# Patient Record
Sex: FEMALE | Race: WHITE | NOT HISPANIC OR LATINO | Employment: OTHER | URBAN - METROPOLITAN AREA
[De-identification: names, ages, dates, MRNs, and addresses within clinical notes are randomized per-mention and may not be internally consistent; named-entity substitution may affect disease eponyms.]

---

## 2024-12-31 ENCOUNTER — HOSPITAL ENCOUNTER (OUTPATIENT)
Facility: HOSPITAL | Age: 89
Setting detail: OBSERVATION
Discharge: HOME | End: 2025-01-01
Attending: STUDENT IN AN ORGANIZED HEALTH CARE EDUCATION/TRAINING PROGRAM | Admitting: STUDENT IN AN ORGANIZED HEALTH CARE EDUCATION/TRAINING PROGRAM
Payer: MEDICARE

## 2024-12-31 ENCOUNTER — APPOINTMENT (OUTPATIENT)
Dept: CARDIOLOGY | Facility: HOSPITAL | Age: 89
End: 2024-12-31
Payer: MEDICARE

## 2024-12-31 ENCOUNTER — APPOINTMENT (OUTPATIENT)
Dept: RADIOLOGY | Facility: HOSPITAL | Age: 89
End: 2024-12-31
Payer: MEDICARE

## 2024-12-31 DIAGNOSIS — I21.4 NSTEMI (NON-ST ELEVATED MYOCARDIAL INFARCTION) (MULTI): Primary | ICD-10-CM

## 2024-12-31 PROBLEM — R07.89 OTHER CHEST PAIN: Status: ACTIVE | Noted: 2024-12-31

## 2024-12-31 PROBLEM — I48.0 PAROXYSMAL ATRIAL FIBRILLATION (MULTI): Status: ACTIVE | Noted: 2024-12-31

## 2024-12-31 LAB
ALBUMIN SERPL BCP-MCNC: 4.1 G/DL (ref 3.4–5)
ALP SERPL-CCNC: 69 U/L (ref 33–136)
ALT SERPL W P-5'-P-CCNC: 15 U/L (ref 7–45)
ANION GAP SERPL CALC-SCNC: 16 MMOL/L (ref 10–20)
APPEARANCE UR: CLEAR
AST SERPL W P-5'-P-CCNC: 27 U/L (ref 9–39)
BASOPHILS # BLD AUTO: 0.04 X10*3/UL (ref 0–0.1)
BASOPHILS NFR BLD AUTO: 0.1 %
BILIRUB SERPL-MCNC: 1 MG/DL (ref 0–1.2)
BILIRUB UR STRIP.AUTO-MCNC: NEGATIVE MG/DL
BUN SERPL-MCNC: 33 MG/DL (ref 6–23)
CALCIUM SERPL-MCNC: 9.4 MG/DL (ref 8.6–10.3)
CARDIAC TROPONIN I PNL SERPL HS: 21 NG/L (ref 0–13)
CARDIAC TROPONIN I PNL SERPL HS: 24 NG/L (ref 0–13)
CHLORIDE SERPL-SCNC: 102 MMOL/L (ref 98–107)
CO2 SERPL-SCNC: 22 MMOL/L (ref 21–32)
COLOR UR: YELLOW
CREAT SERPL-MCNC: 1.28 MG/DL (ref 0.5–1.05)
D DIMER PPP FEU-MCNC: 756 NG/ML FEU
EGFRCR SERPLBLD CKD-EPI 2021: 38 ML/MIN/1.73M*2
EOSINOPHIL # BLD AUTO: 0 X10*3/UL (ref 0–0.4)
EOSINOPHIL NFR BLD AUTO: 0 %
ERYTHROCYTE [DISTWIDTH] IN BLOOD BY AUTOMATED COUNT: 14.4 % (ref 11.5–14.5)
FLUAV RNA RESP QL NAA+PROBE: NOT DETECTED
FLUBV RNA RESP QL NAA+PROBE: NOT DETECTED
GLUCOSE SERPL-MCNC: 123 MG/DL (ref 74–99)
GLUCOSE UR STRIP.AUTO-MCNC: NORMAL MG/DL
HCT VFR BLD AUTO: 39.7 % (ref 36–46)
HGB BLD-MCNC: 12.8 G/DL (ref 12–16)
HOLD SPECIMEN: NORMAL
IMM GRANULOCYTES # BLD AUTO: 0.34 X10*3/UL (ref 0–0.5)
IMM GRANULOCYTES NFR BLD AUTO: 1.2 % (ref 0–0.9)
KETONES UR STRIP.AUTO-MCNC: NEGATIVE MG/DL
LEUKOCYTE ESTERASE UR QL STRIP.AUTO: ABNORMAL
LYMPHOCYTES # BLD AUTO: 1.74 X10*3/UL (ref 0.8–3)
LYMPHOCYTES NFR BLD AUTO: 6.2 %
MAGNESIUM SERPL-MCNC: 1.86 MG/DL (ref 1.6–2.4)
MCH RBC QN AUTO: 29.6 PG (ref 26–34)
MCHC RBC AUTO-ENTMCNC: 32.2 G/DL (ref 32–36)
MCV RBC AUTO: 92 FL (ref 80–100)
MONOCYTES # BLD AUTO: 2.01 X10*3/UL (ref 0.05–0.8)
MONOCYTES NFR BLD AUTO: 7.2 %
MUCOUS THREADS #/AREA URNS AUTO: NORMAL /LPF
NEUTROPHILS # BLD AUTO: 23.79 X10*3/UL (ref 1.6–5.5)
NEUTROPHILS NFR BLD AUTO: 85.3 %
NITRITE UR QL STRIP.AUTO: NEGATIVE
NRBC BLD-RTO: 0 /100 WBCS (ref 0–0)
PH UR STRIP.AUTO: 6 [PH]
PLATELET # BLD AUTO: 254 X10*3/UL (ref 150–450)
POTASSIUM SERPL-SCNC: 4.7 MMOL/L (ref 3.5–5.3)
PROT SERPL-MCNC: 6.7 G/DL (ref 6.4–8.2)
PROT UR STRIP.AUTO-MCNC: ABNORMAL MG/DL
RBC # BLD AUTO: 4.32 X10*6/UL (ref 4–5.2)
RBC # UR STRIP.AUTO: ABNORMAL /UL
RBC #/AREA URNS AUTO: NORMAL /HPF
SARS-COV-2 RNA RESP QL NAA+PROBE: NOT DETECTED
SODIUM SERPL-SCNC: 135 MMOL/L (ref 136–145)
SP GR UR STRIP.AUTO: 1.02
SQUAMOUS #/AREA URNS AUTO: NORMAL /HPF
UROBILINOGEN UR STRIP.AUTO-MCNC: NORMAL MG/DL
WBC # BLD AUTO: 27.9 X10*3/UL (ref 4.4–11.3)
WBC #/AREA URNS AUTO: NORMAL /HPF

## 2024-12-31 PROCEDURE — 71045 X-RAY EXAM CHEST 1 VIEW: CPT | Performed by: RADIOLOGY

## 2024-12-31 PROCEDURE — 84484 ASSAY OF TROPONIN QUANT: CPT

## 2024-12-31 PROCEDURE — 81001 URINALYSIS AUTO W/SCOPE: CPT

## 2024-12-31 PROCEDURE — 87636 SARSCOV2 & INF A&B AMP PRB: CPT

## 2024-12-31 PROCEDURE — 87086 URINE CULTURE/COLONY COUNT: CPT | Mod: STJLAB

## 2024-12-31 PROCEDURE — 71045 X-RAY EXAM CHEST 1 VIEW: CPT

## 2024-12-31 PROCEDURE — 36415 COLL VENOUS BLD VENIPUNCTURE: CPT | Performed by: STUDENT IN AN ORGANIZED HEALTH CARE EDUCATION/TRAINING PROGRAM

## 2024-12-31 PROCEDURE — 85025 COMPLETE CBC W/AUTO DIFF WBC: CPT

## 2024-12-31 PROCEDURE — 36415 COLL VENOUS BLD VENIPUNCTURE: CPT

## 2024-12-31 PROCEDURE — 80053 COMPREHEN METABOLIC PANEL: CPT

## 2024-12-31 PROCEDURE — 93005 ELECTROCARDIOGRAM TRACING: CPT

## 2024-12-31 PROCEDURE — G0378 HOSPITAL OBSERVATION PER HR: HCPCS

## 2024-12-31 PROCEDURE — 99285 EMERGENCY DEPT VISIT HI MDM: CPT | Performed by: STUDENT IN AN ORGANIZED HEALTH CARE EDUCATION/TRAINING PROGRAM

## 2024-12-31 PROCEDURE — 85379 FIBRIN DEGRADATION QUANT: CPT | Performed by: STUDENT IN AN ORGANIZED HEALTH CARE EDUCATION/TRAINING PROGRAM

## 2024-12-31 PROCEDURE — 2500000001 HC RX 250 WO HCPCS SELF ADMINISTERED DRUGS (ALT 637 FOR MEDICARE OP): Performed by: NURSE PRACTITIONER

## 2024-12-31 PROCEDURE — 83735 ASSAY OF MAGNESIUM: CPT

## 2024-12-31 RX ORDER — OXYCODONE AND ACETAMINOPHEN 5; 325 MG/1; MG/1
1.5 TABLET ORAL EVERY 8 HOURS PRN
Status: DISCONTINUED | OUTPATIENT
Start: 2024-12-31 | End: 2025-01-01 | Stop reason: HOSPADM

## 2024-12-31 RX ORDER — PANTOPRAZOLE SODIUM 40 MG/1
40 TABLET, DELAYED RELEASE ORAL
COMMUNITY

## 2024-12-31 RX ORDER — OXYCODONE AND ACETAMINOPHEN 7.5; 325 MG/1; MG/1
1 TABLET ORAL EVERY 8 HOURS PRN
COMMUNITY

## 2024-12-31 RX ORDER — DILTIAZEM HYDROCHLORIDE 180 MG/1
180 CAPSULE, COATED, EXTENDED RELEASE ORAL 2 TIMES DAILY
COMMUNITY

## 2024-12-31 RX ORDER — VALSARTAN 160 MG/1
80 TABLET ORAL DAILY
Status: DISCONTINUED | OUTPATIENT
Start: 2025-01-01 | End: 2025-01-01 | Stop reason: HOSPADM

## 2024-12-31 RX ORDER — PANTOPRAZOLE SODIUM 40 MG/1
40 TABLET, DELAYED RELEASE ORAL
Status: DISCONTINUED | OUTPATIENT
Start: 2025-01-01 | End: 2025-01-01 | Stop reason: HOSPADM

## 2024-12-31 RX ORDER — CANDESARTAN 8 MG/1
4 TABLET ORAL 2 TIMES DAILY
COMMUNITY

## 2024-12-31 RX ORDER — FUROSEMIDE 20 MG/1
20 TABLET ORAL DAILY
COMMUNITY

## 2024-12-31 RX ORDER — NAPROXEN SODIUM 220 MG/1
81 TABLET, FILM COATED ORAL DAILY
Status: DISCONTINUED | OUTPATIENT
Start: 2025-01-01 | End: 2025-01-01 | Stop reason: HOSPADM

## 2024-12-31 RX ORDER — DILTIAZEM HYDROCHLORIDE 180 MG/1
180 CAPSULE, COATED, EXTENDED RELEASE ORAL 2 TIMES DAILY
Status: DISCONTINUED | OUTPATIENT
Start: 2024-12-31 | End: 2025-01-01 | Stop reason: HOSPADM

## 2024-12-31 RX ORDER — ROSUVASTATIN CALCIUM 5 MG/1
5 TABLET, COATED ORAL DAILY
COMMUNITY

## 2024-12-31 RX ORDER — FUROSEMIDE 20 MG/1
20 TABLET ORAL DAILY
Status: DISCONTINUED | OUTPATIENT
Start: 2024-12-31 | End: 2025-01-01 | Stop reason: HOSPADM

## 2024-12-31 RX ORDER — ROSUVASTATIN CALCIUM 5 MG/1
5 TABLET, COATED ORAL DAILY
Status: DISCONTINUED | OUTPATIENT
Start: 2025-01-01 | End: 2025-01-01 | Stop reason: HOSPADM

## 2024-12-31 RX ORDER — GUAIFENESIN 100 MG/5ML
200 SOLUTION ORAL EVERY 4 HOURS PRN
Status: DISCONTINUED | OUTPATIENT
Start: 2024-12-31 | End: 2025-01-01 | Stop reason: HOSPADM

## 2024-12-31 RX ADMIN — FUROSEMIDE 20 MG: 20 TABLET ORAL at 17:06

## 2024-12-31 RX ADMIN — GUAIFENESIN 200 MG: 200 SOLUTION ORAL at 17:06

## 2024-12-31 RX ADMIN — DILTIAZEM HYDROCHLORIDE 180 MG: 180 CAPSULE, COATED, EXTENDED RELEASE ORAL at 20:48

## 2024-12-31 RX ADMIN — APIXABAN 2.5 MG: 5 TABLET, FILM COATED ORAL at 20:45

## 2024-12-31 SDOH — SOCIAL STABILITY: SOCIAL NETWORK
DO YOU BELONG TO ANY CLUBS OR ORGANIZATIONS SUCH AS CHURCH GROUPS, UNIONS, FRATERNAL OR ATHLETIC GROUPS, OR SCHOOL GROUPS?: NO

## 2024-12-31 SDOH — SOCIAL STABILITY: SOCIAL NETWORK: HOW OFTEN DO YOU GET TOGETHER WITH FRIENDS OR RELATIVES?: MORE THAN THREE TIMES A WEEK

## 2024-12-31 SDOH — SOCIAL STABILITY: SOCIAL INSECURITY: ABUSE: ADULT

## 2024-12-31 SDOH — HEALTH STABILITY: MENTAL HEALTH
DO YOU FEEL STRESS - TENSE, RESTLESS, NERVOUS, OR ANXIOUS, OR UNABLE TO SLEEP AT NIGHT BECAUSE YOUR MIND IS TROUBLED ALL THE TIME - THESE DAYS?: NOT AT ALL

## 2024-12-31 SDOH — SOCIAL STABILITY: SOCIAL INSECURITY
WITHIN THE LAST YEAR, HAVE YOU BEEN RAPED OR FORCED TO HAVE ANY KIND OF SEXUAL ACTIVITY BY YOUR PARTNER OR EX-PARTNER?: NO

## 2024-12-31 SDOH — SOCIAL STABILITY: SOCIAL INSECURITY: WITHIN THE LAST YEAR, HAVE YOU BEEN AFRAID OF YOUR PARTNER OR EX-PARTNER?: NO

## 2024-12-31 SDOH — ECONOMIC STABILITY: INCOME INSECURITY: IN THE PAST 12 MONTHS HAS THE ELECTRIC, GAS, OIL, OR WATER COMPANY THREATENED TO SHUT OFF SERVICES IN YOUR HOME?: NO

## 2024-12-31 SDOH — SOCIAL STABILITY: SOCIAL NETWORK: HOW OFTEN DO YOU ATTEND CHURCH OR RELIGIOUS SERVICES?: NEVER

## 2024-12-31 SDOH — SOCIAL STABILITY: SOCIAL INSECURITY: DOES ANYONE TRY TO KEEP YOU FROM HAVING/CONTACTING OTHER FRIENDS OR DOING THINGS OUTSIDE YOUR HOME?: NO

## 2024-12-31 SDOH — SOCIAL STABILITY: SOCIAL INSECURITY: DO YOU FEEL ANYONE HAS EXPLOITED OR TAKEN ADVANTAGE OF YOU FINANCIALLY OR OF YOUR PERSONAL PROPERTY?: NO

## 2024-12-31 SDOH — SOCIAL STABILITY: SOCIAL NETWORK
IN A TYPICAL WEEK, HOW MANY TIMES DO YOU TALK ON THE PHONE WITH FAMILY, FRIENDS, OR NEIGHBORS?: MORE THAN THREE TIMES A WEEK

## 2024-12-31 SDOH — ECONOMIC STABILITY: HOUSING INSECURITY: AT ANY TIME IN THE PAST 12 MONTHS, WERE YOU HOMELESS OR LIVING IN A SHELTER (INCLUDING NOW)?: NO

## 2024-12-31 SDOH — SOCIAL STABILITY: SOCIAL INSECURITY: HAS ANYONE EVER THREATENED TO HURT YOUR FAMILY OR YOUR PETS?: NO

## 2024-12-31 SDOH — HEALTH STABILITY: PHYSICAL HEALTH
HOW OFTEN DO YOU NEED TO HAVE SOMEONE HELP YOU WHEN YOU READ INSTRUCTIONS, PAMPHLETS, OR OTHER WRITTEN MATERIAL FROM YOUR DOCTOR OR PHARMACY?: NEVER

## 2024-12-31 SDOH — SOCIAL STABILITY: SOCIAL INSECURITY
WITHIN THE LAST YEAR, HAVE YOU BEEN KICKED, HIT, SLAPPED, OR OTHERWISE PHYSICALLY HURT BY YOUR PARTNER OR EX-PARTNER?: NO

## 2024-12-31 SDOH — ECONOMIC STABILITY: FOOD INSECURITY: WITHIN THE PAST 12 MONTHS, THE FOOD YOU BOUGHT JUST DIDN'T LAST AND YOU DIDN'T HAVE MONEY TO GET MORE.: NEVER TRUE

## 2024-12-31 SDOH — SOCIAL STABILITY: SOCIAL INSECURITY: WITHIN THE LAST YEAR, HAVE YOU BEEN HUMILIATED OR EMOTIONALLY ABUSED IN OTHER WAYS BY YOUR PARTNER OR EX-PARTNER?: NO

## 2024-12-31 SDOH — ECONOMIC STABILITY: FOOD INSECURITY: WITHIN THE PAST 12 MONTHS, YOU WORRIED THAT YOUR FOOD WOULD RUN OUT BEFORE YOU GOT THE MONEY TO BUY MORE.: NEVER TRUE

## 2024-12-31 SDOH — SOCIAL STABILITY: SOCIAL INSECURITY: HAVE YOU HAD THOUGHTS OF HARMING ANYONE ELSE?: NO

## 2024-12-31 SDOH — SOCIAL STABILITY: SOCIAL INSECURITY: WERE YOU ABLE TO COMPLETE ALL THE BEHAVIORAL HEALTH SCREENINGS?: YES

## 2024-12-31 SDOH — HEALTH STABILITY: PHYSICAL HEALTH: ON AVERAGE, HOW MANY DAYS PER WEEK DO YOU ENGAGE IN MODERATE TO STRENUOUS EXERCISE (LIKE A BRISK WALK)?: 3 DAYS

## 2024-12-31 SDOH — SOCIAL STABILITY: SOCIAL INSECURITY: DO YOU FEEL UNSAFE GOING BACK TO THE PLACE WHERE YOU ARE LIVING?: NO

## 2024-12-31 SDOH — SOCIAL STABILITY: SOCIAL INSECURITY: ARE YOU OR HAVE YOU BEEN THREATENED OR ABUSED PHYSICALLY, EMOTIONALLY, OR SEXUALLY BY ANYONE?: NO

## 2024-12-31 SDOH — HEALTH STABILITY: PHYSICAL HEALTH: ON AVERAGE, HOW MANY MINUTES DO YOU ENGAGE IN EXERCISE AT THIS LEVEL?: 30 MIN

## 2024-12-31 SDOH — SOCIAL STABILITY: SOCIAL NETWORK: HOW OFTEN DO YOU ATTEND MEETINGS OF THE CLUBS OR ORGANIZATIONS YOU BELONG TO?: NEVER

## 2024-12-31 SDOH — SOCIAL STABILITY: SOCIAL INSECURITY: HAVE YOU HAD ANY THOUGHTS OF HARMING ANYONE ELSE?: NO

## 2024-12-31 SDOH — SOCIAL STABILITY: SOCIAL INSECURITY: ARE THERE ANY APPARENT SIGNS OF INJURIES/BEHAVIORS THAT COULD BE RELATED TO ABUSE/NEGLECT?: NO

## 2024-12-31 ASSESSMENT — ACTIVITIES OF DAILY LIVING (ADL)
HEARING - RIGHT EAR: DIFFICULTY WITH NOISE
ADEQUATE_TO_COMPLETE_ADL: YES
FEEDING YOURSELF: INDEPENDENT
LACK_OF_TRANSPORTATION: NO
HEARING - LEFT EAR: DIFFICULTY WITH NOISE
GROOMING: INDEPENDENT
DRESSING YOURSELF: INDEPENDENT
JUDGMENT_ADEQUATE_SAFELY_COMPLETE_DAILY_ACTIVITIES: YES
WALKS IN HOME: INDEPENDENT
TOILETING: INDEPENDENT
PATIENT'S MEMORY ADEQUATE TO SAFELY COMPLETE DAILY ACTIVITIES?: YES
BATHING: INDEPENDENT

## 2024-12-31 ASSESSMENT — LIFESTYLE VARIABLES
HOW OFTEN DO YOU HAVE 6 OR MORE DRINKS ON ONE OCCASION: NEVER
TOTAL SCORE: 0
AUDIT-C TOTAL SCORE: 2
EVER FELT BAD OR GUILTY ABOUT YOUR DRINKING: NO
HAVE PEOPLE ANNOYED YOU BY CRITICIZING YOUR DRINKING: NO
AUDIT-C TOTAL SCORE: 2
SKIP TO QUESTIONS 9-10: 1
HAVE YOU EVER FELT YOU SHOULD CUT DOWN ON YOUR DRINKING: NO
SUBSTANCE_ABUSE_PAST_12_MONTHS: NO
HOW OFTEN DO YOU HAVE A DRINK CONTAINING ALCOHOL: 2-4 TIMES A MONTH
EVER HAD A DRINK FIRST THING IN THE MORNING TO STEADY YOUR NERVES TO GET RID OF A HANGOVER: NO
PRESCIPTION_ABUSE_PAST_12_MONTHS: NO
HOW MANY STANDARD DRINKS CONTAINING ALCOHOL DO YOU HAVE ON A TYPICAL DAY: 1 OR 2

## 2024-12-31 ASSESSMENT — COGNITIVE AND FUNCTIONAL STATUS - GENERAL
DAILY ACTIVITIY SCORE: 24
CLIMB 3 TO 5 STEPS WITH RAILING: A LITTLE
PATIENT BASELINE BEDBOUND: NO
MOBILITY SCORE: 23

## 2024-12-31 ASSESSMENT — COLUMBIA-SUICIDE SEVERITY RATING SCALE - C-SSRS
6. HAVE YOU EVER DONE ANYTHING, STARTED TO DO ANYTHING, OR PREPARED TO DO ANYTHING TO END YOUR LIFE?: NO
6. HAVE YOU EVER DONE ANYTHING, STARTED TO DO ANYTHING, OR PREPARED TO DO ANYTHING TO END YOUR LIFE?: NO
1. IN THE PAST MONTH, HAVE YOU WISHED YOU WERE DEAD OR WISHED YOU COULD GO TO SLEEP AND NOT WAKE UP?: NO
2. HAVE YOU ACTUALLY HAD ANY THOUGHTS OF KILLING YOURSELF?: NO
2. HAVE YOU ACTUALLY HAD ANY THOUGHTS OF KILLING YOURSELF?: NO
1. IN THE PAST MONTH, HAVE YOU WISHED YOU WERE DEAD OR WISHED YOU COULD GO TO SLEEP AND NOT WAKE UP?: NO

## 2024-12-31 ASSESSMENT — HEART SCORE
HISTORY: SLIGHTLY SUSPICIOUS
AGE: 65+
TROPONIN: 1-3 TIMES NORMAL LIMIT
RISK FACTORS: >2 RISK FACTORS OR HX OF ATHEROSCLEROTIC DISEASE
ECG: NORMAL
HEART SCORE: 5

## 2024-12-31 ASSESSMENT — PATIENT HEALTH QUESTIONNAIRE - PHQ9
SUM OF ALL RESPONSES TO PHQ9 QUESTIONS 1 & 2: 0
1. LITTLE INTEREST OR PLEASURE IN DOING THINGS: NOT AT ALL
2. FEELING DOWN, DEPRESSED OR HOPELESS: NOT AT ALL

## 2024-12-31 ASSESSMENT — ENCOUNTER SYMPTOMS
COUGH: 1
NEUROLOGICAL NEGATIVE: 1
FEVER: 0
ACTIVITY CHANGE: 0
GASTROINTESTINAL NEGATIVE: 1
SHORTNESS OF BREATH: 1
CHILLS: 0
MUSCULOSKELETAL NEGATIVE: 1
PSYCHIATRIC NEGATIVE: 1

## 2024-12-31 ASSESSMENT — PAIN SCALES - GENERAL
PAINLEVEL_OUTOF10: 0 - NO PAIN
PAINLEVEL_OUTOF10: 0 - NO PAIN

## 2024-12-31 ASSESSMENT — PAIN - FUNCTIONAL ASSESSMENT
PAIN_FUNCTIONAL_ASSESSMENT: 0-10
PAIN_FUNCTIONAL_ASSESSMENT: 0-10

## 2024-12-31 NOTE — H&P
"History Of Present Illness  Lu Luz is a 89 y.o. female  with history including carotid artery stenosis, hyperlipidemia, hypertension, sick sinus syndrome, A-fib on Eliquis presenting to the emergency department for chest pain that occurred yesterday.  Patient states she was sitting down when she experienced upper diffuse chest discomfort that was non radiating, subsided without intervention. Patient states she went to bed and woke up and has not experienced any symptoms since that time. Daughter at bedside indicates that her mother did not tell her until today, but indicated \"thought I was having a heart attack\" so she brought her to the ED. She states that her mother was at the daughter's 's  on Friday, she was around a lot of people who have indicated they were sick. Patient also reports a wet cough since the weekend. Denies any leg edema, is on Lasix at home, denies any orthopnea. She denies any diaphoresis. She does endorse shortness of breath and nausea with the chest pain episode yesterday which has also resolved.  Has not had any chest pain today, does states she has mild lightheadedness.  Currently denies any chest pain, shortness of breath, abdominal pain, nausea or vomiting, constipation diarrhea, dysuria. Daughter indicates EMS indicated that mom was in atrial fibrillation and she does not remember that being a diagnosis for her. Patient to be admitted for acs rule out.      Past Medical History  CKD (chronic kidney disease) 2015   Controlled type 2 diabetes mellitus without complication, without long-term current use of insulin (HCC) 2016   DDD (degenerative disc disease), cervical 2017   Depression   Exudative age-related macular degeneration, left eye, with active choroidal neovascularization (HCC)   Generalized osteoarthritis   GERD (gastroesophageal reflux disease)   HTN (hypertension)   Hyperlipidemia   Occlusion and stenosis of carotid artery without mention " of cerebral infarction   Paroxysmal atrial fibrillation (HCC)   Retinal edema   Spinal stenosis, lumbar region, without neurogenic claudication 2010     Surgical History  AVASTIN (BEVACIZUMAB) 1.25MG INTRAVITREAL INJECTION OS (LEFT EYE) Left 2022   LAST   CATARACT EXTRACTION HX Right 10/13/2021   CHOLECYSTECTOMY 's   COLON SURGERY HX 2012   twisted colon   COLONOSCOPY    Dr. Mondragon   EGD 2014   EXC/DSTRJ LINGUAL TONSIL ANY METHOD SPX Child   EYLEA (AFLIBERCEPT) 2MG INTRAVITREAL INJECTION OS (LEFT EYE) Left 10/04/2022   last   INJ WO/CATH ANES/STER LS 2010   BLOCK STEROID EPIDURAL LUMBAR WITH C-ARM performed by CATHERINE ALEXANDER at  [facility]   REMOVE CATARACT, INSERT LENS,EX Left 20 + years ago   SHX VASCULAR SURGERY 2015   right CEA   VAGINAL HYSTERECTOMY UTERUS 250 GM/< late 1970's   One ovary remains   VITRECTOMY MECHANICAL PARS PLANA Left 2016   PPV and exchange of posterior chamber lens for anterior chamber lens.      Social History  Smoking status: Former   Packs/day: 2.00   Years: 35.00   Additional pack years: 0.00   Total pack years: 70.00   Types: Cigarettes   Quit date: 6/3/1979   Years since quittin.9   Smokeless tobacco: Never   Vaping Use   Vaping Use: Never used   Substance Use Topics   Alcohol use: Yes   Alcohol/week: 2.0 standard drinks of alcohol   Types: 2 Glasses of Wine (5oz) per week   Comment: occassional 1-2 drinks per week   Drug use: No     Family History  Hypertension Mother      Alzheimer's Disease Mother   DVT Mother   Macular Degen Mother   Heart Father      Breast Cancer Sister      Colon Cancer No Family History      Allergies  Patient has no known allergies.    Review of Systems   Constitutional:  Negative for activity change, chills and fever.   HENT: Negative.     Respiratory:  Positive for cough and shortness of breath.    Cardiovascular:  Positive for chest pain.   Gastrointestinal: Negative.    Genitourinary:  "Negative.    Musculoskeletal: Negative.    Skin: Negative.    Neurological: Negative.    Psychiatric/Behavioral: Negative.        ROS: 12 systems reviewed and negative except per HPI above     Physical Exam by System:     Constitutional: Well developed, awake/alert/oriented x3, no distress, alert and cooperative   ENMT: mucous membranes moist   Head/Neck: Neck supple   Respiratory/Thorax: Patent airways, CTAB, normal breath sounds with good chest expansion, thorax symmetric   Cardiovascular: IRR, no murmurs, 2+ equal pulses of the extremities, normal S 1and S 2   Gastrointestinal: Nondistended, soft, non-tender, no rebound tenderness or guarding, no masses palpable, no organomegaly, +BS, no bruits   Musculoskeletal: ROM intact, no joint swelling, normal strength   Extremities: normal extremities, no cyanosis edema, contusions or wounds, no clubbing   Neurological: alert and oriented x3, intact senses, motor, response and reflexes, normal strength       Last Recorded Vitals  Blood pressure 156/69, pulse 74, temperature 37 °C (98.6 °F), temperature source Tympanic, resp. rate 16, height 1.626 m (5' 4\"), weight 52 kg (114 lb 10.2 oz), SpO2 98%.    Relevant Results  Results for orders placed or performed during the hospital encounter of 12/31/24 (from the past 24 hours)   CBC and Auto Differential   Result Value Ref Range    WBC 27.9 (H) 4.4 - 11.3 x10*3/uL    nRBC 0.0 0.0 - 0.0 /100 WBCs    RBC 4.32 4.00 - 5.20 x10*6/uL    Hemoglobin 12.8 12.0 - 16.0 g/dL    Hematocrit 39.7 36.0 - 46.0 %    MCV 92 80 - 100 fL    MCH 29.6 26.0 - 34.0 pg    MCHC 32.2 32.0 - 36.0 g/dL    RDW 14.4 11.5 - 14.5 %    Platelets 254 150 - 450 x10*3/uL    Neutrophils % 85.3 40.0 - 80.0 %    Immature Granulocytes %, Automated 1.2 (H) 0.0 - 0.9 %    Lymphocytes % 6.2 13.0 - 44.0 %    Monocytes % 7.2 2.0 - 10.0 %    Eosinophils % 0.0 0.0 - 6.0 %    Basophils % 0.1 0.0 - 2.0 %    Neutrophils Absolute 23.79 (H) 1.60 - 5.50 x10*3/uL    Immature " Granulocytes Absolute, Automated 0.34 0.00 - 0.50 x10*3/uL    Lymphocytes Absolute 1.74 0.80 - 3.00 x10*3/uL    Monocytes Absolute 2.01 (H) 0.05 - 0.80 x10*3/uL    Eosinophils Absolute 0.00 0.00 - 0.40 x10*3/uL    Basophils Absolute 0.04 0.00 - 0.10 x10*3/uL   Comprehensive Metabolic Panel   Result Value Ref Range    Glucose 123 (H) 74 - 99 mg/dL    Sodium 135 (L) 136 - 145 mmol/L    Potassium 4.7 3.5 - 5.3 mmol/L    Chloride 102 98 - 107 mmol/L    Bicarbonate 22 21 - 32 mmol/L    Anion Gap 16 10 - 20 mmol/L    Urea Nitrogen 33 (H) 6 - 23 mg/dL    Creatinine 1.28 (H) 0.50 - 1.05 mg/dL    eGFR 38 (L) >60 mL/min/1.73m*2    Calcium 9.4 8.6 - 10.3 mg/dL    Albumin 4.1 3.4 - 5.0 g/dL    Alkaline Phosphatase 69 33 - 136 U/L    Total Protein 6.7 6.4 - 8.2 g/dL    AST 27 9 - 39 U/L    Bilirubin, Total 1.0 0.0 - 1.2 mg/dL    ALT 15 7 - 45 U/L   Magnesium   Result Value Ref Range    Magnesium 1.86 1.60 - 2.40 mg/dL   Troponin I, High Sensitivity, Initial   Result Value Ref Range    Troponin I, High Sensitivity 24 (H) 0 - 13 ng/L   Sars-CoV-2 and Influenza A/B PCR   Result Value Ref Range    Flu A Result Not Detected Not Detected    Flu B Result Not Detected Not Detected    Coronavirus 2019, PCR Not Detected Not Detected   Light Blue Top   Result Value Ref Range    Extra Tube Hold for add-ons.    D-dimer, VTE Exclusion   Result Value Ref Range    D-Dimer, Quantitative VTE Exclusion 756 (H) <=500 ng/mL FEU   Troponin, High Sensitivity, 1 Hour   Result Value Ref Range    Troponin I, High Sensitivity 21 (H) 0 - 13 ng/L      Scheduled medications    Continuous medications    PRN medications       XR chest 1 view    Result Date: 12/31/2024  Interpreted By:  Carin Thayer, STUDY: Chest, single AP view.   INDICATION: Signs/Symptoms:Chest Pain, palpitations.   COMPARISON: None   ACCESSION NUMBER(S): [id number]   ORDERING CLINICIAN: CHRIS CLARK   FINDINGS: The cardiac silhouette size is within normal limits. There is no focal  consolidation, edema or pneumothorax. No sizeable pleural effusion. No acute osseous abnormality.       1. No acute cardiopulmonary process.   MACRO: None.   Signed by: Carin Thayer 12/31/2024 2:10 PM Dictation workstation:   [id number]       Assessment/Plan     Chest pain r/o acs  Upper respiratory symptoms including cough, likely viral   History of CKD  History of T2DM  History hypertension  History of hyperlipidemia  History of GERD  History of paroxysmal atrial fibrillation    Plan:    -Admit to observation with tele  -Current vitals jeanette stable and patient is 98% on room air  -Cardiology placed on consult Dr. Coello, defer echo to cardiology  -Troponin 24, second troponin 21  -Added Aspirin 81 mg daily  -Resume patient medications including Eliquis, Cardizem CD, Candesartan, 5 mg Crestor  -Am labs including cbc, bmp, mag, lipid  -Hemoglobin A1C in 10/24 was 6.5  -D dimer 756 age adjusted d dimer 970ug/L VTE unlikely   -Influenza A B and Covid not detected  -Add robitussin q 4 hours PRN for wet cough  -POC discussed with patient and attending  -Dvtp: resume patient Eliquis  -PT OT  -Dispo: likely require less than 2 midnight stays to adequately treat and safe dc plan    Code status discussed with patient and patient's daughter Izabella who is POA, patient is DNR DNI    I spent >50 minutes in the professional and overall care of this patient.    SIGNATURE: NAE Junior-CNP PATIENT NAME: Lu Luz   DATE: December 31, 2024 MRN: [id number]   TIME: 3:19 PM

## 2024-12-31 NOTE — ED PROVIDER NOTES
EMERGENCY DEPARTMENT ENCOUNTER      Pt Name: Lu Luz  MRN: [id number]  Birthdate 3/30/1927  Date of evaluation: 12/31/2024    HISTORY OF PRESENT ILLNESS    Lu Luz is an 89 y.o. female with history including carotid artery stenosis, hyperlipidemia, hypertension, sick sinus syndrome, A-fib on Eliquis presenting to the emergency department for chest pain that occurred yesterday.  She is not having any pain at this moment.  She has also had a cough for a few days.  Has not had any chest pain today, does states she has mild lightheadedness.  Currently denies any chest pain, shortness of breath, abdominal pain, nausea or vomiting, constipation diarrhea, dysuria.      PAST MEDICAL HISTORY   No past medical history on file.    SURGICAL HISTORY     No past surgical history on file.    CURRENT MEDICATIONS       Previous Medications    APIXABAN (ELIQUIS) 2.5 MG TABLET    Take 1 tablet (2.5 mg) by mouth 2 times a day.    CANDESARTAN (ATACAND) 8 MG TABLET    Take 0.5 tablets (4 mg) by mouth 2 times a day.    DILTIAZEM CD (CARDIZEM CD) 180 MG 24 HR CAPSULE    Take 1 capsule (180 mg) by mouth 2 times a day.    FUROSEMIDE (LASIX) 20 MG TABLET    Take 1 tablet (20 mg) by mouth once daily.    OXYCODONE-ACETAMINOPHEN (PERCOCET) 7.5-325 MG TABLET    Take 1 tablet by mouth every 8 hours if needed for severe pain (7 - 10).    PANTOPRAZOLE (PROTONIX) 40 MG EC TABLET    Take 1 tablet (40 mg) by mouth once daily in the morning. Take before meals. Do not crush, chew, or split.    ROSUVASTATIN (CRESTOR) 5 MG TABLET    Take 1 tablet (5 mg) by mouth once daily.       ALLERGIES     Patient has no known allergies.    FAMILY HISTORY     No family history on file.     SOCIAL HISTORY       Social History     Socioeconomic History    Marital status:    Tobacco Use    Smoking status: Never     Social Drivers of Health     Financial Resource Strain: Patient Declined (11/13/2024)    Received from [facility]    Overall Financial  Resource Strain (CARDIA)     Difficulty of Paying Living Expenses: Patient declined   Food Insecurity: Patient Declined (11/13/2024)    Received from [facility]    Hunger Vital Sign     Worried About Running Out of Food in the Last Year: Patient declined     Ran Out of Food in the Last Year: Patient declined   Transportation Needs: Patient Declined (11/13/2024)    Received from [facility]    PRAPARE - Transportation     Lack of Transportation (Medical): Patient declined     Lack of Transportation (Non-Medical): Patient declined   Physical Activity: Patient Declined (11/13/2024)    Received from [facility]    Exercise Vital Sign     Days of Exercise per Week: Patient declined     Minutes of Exercise per Session: Patient declined   Stress: Patient Declined (11/13/2024)    Received from [facility]    [place] [employer] - Occupational Stress Questionnaire     Feeling of Stress : Patient declined   Social Connections: Unknown (11/13/2024)    Received from [facility]    Social Connection and Isolation Panel [NHANES]     Frequency of Communication with Friends and Family: More than three times a week     Frequency of Social Gatherings with Friends and Family: Once a week     Attends Tenriism Services: Patient declined     Active Member of Clubs or Organizations: Patient declined     Attends Club or Organization Meetings: Patient declined     Marital Status: Patient declined   Housing Stability: Low Risk  (10/16/2023)    Received from [facility]    Housing Stability Vital Sign     Unable to Pay for Housing in the Last Year: No     Number of Places Lived in the Last Year: 1     Unstable Housing in the Last Year: No       PHYSICAL EXAM       ED Triage Vitals [12/31/24 1327]   Temperature Heart Rate Respirations BP   37 °C (98.6 °F) 74 16 156/69      Pulse Ox Temp Source Heart Rate Source Patient Position   98 % Tympanic Monitor Lying      BP Location FiO2 (%)      Right arm --       Physical Exam  Constitutional:       Appearance: She is well-developed.   HENT:      Head: Normocephalic and atraumatic.   Eyes:      Pupils: Pupils are equal, round, and reactive to light.   Cardiovascular:      Rate and Rhythm: Normal rate. Rhythm irregular.      Pulses:           Radial pulses are 2+ on the right side and 2+ on the left side.        Dorsalis pedis pulses are 2+ on the right side and 2+ on the left side.      Heart sounds: Normal heart sounds.   Pulmonary:      Effort: Pulmonary effort is normal. No tachypnea.      Breath sounds: Examination of the left-lower field reveals rhonchi and rales. Rhonchi and rales present.   Abdominal:      General: Bowel sounds are normal.      Palpations: Abdomen is soft.   Musculoskeletal:         General: Normal range of motion.      Cervical back: Normal range of motion.      Right lower leg: No edema.      Left lower leg: No edema.   Skin:     General: Skin is warm.      Capillary Refill: Capillary refill takes less than 2 seconds.   Neurological:      General: No focal deficit present.      Mental Status: She is alert.          DIAGNOSTIC RESULTS     LABS:  Labs Reviewed   CBC WITH AUTO DIFFERENTIAL - Abnormal       Result Value    WBC 27.9 (*)     nRBC 0.0      RBC 4.32      Hemoglobin 12.8      Hematocrit 39.7      MCV 92      MCH 29.6      MCHC 32.2      RDW 14.4      Platelets 254      Neutrophils % 85.3      Immature Granulocytes %, Automated 1.2 (*)     Lymphocytes % 6.2      Monocytes % 7.2      Eosinophils % 0.0      Basophils % 0.1      Neutrophils Absolute 23.79 (*)     Immature Granulocytes Absolute, Automated 0.34      Lymphocytes Absolute 1.74      Monocytes Absolute 2.01 (*)     Eosinophils Absolute 0.00      Basophils Absolute 0.04     COMPREHENSIVE METABOLIC PANEL - Abnormal    Glucose 123 (*)     Sodium 135 (*)     Potassium 4.7      Chloride 102      Bicarbonate 22      Anion Gap 16      Urea Nitrogen 33 (*)     Creatinine  1.28 (*)     eGFR 38 (*)     Calcium 9.4      Albumin 4.1      Alkaline Phosphatase 69      Total Protein 6.7      AST 27      Bilirubin, Total 1.0      ALT 15     SERIAL TROPONIN-INITIAL - Abnormal    Troponin I, High Sensitivity 24 (*)     Narrative:     Less than 99th percentile of normal range cutoff-  Female and children under 18 years old <14 ng/L; Male <21 ng/L: Negative  Repeat testing should be performed if clinically indicated.     Female and children under 18 years old 14-50 ng/L; Male 21-50 ng/L:  Consistent with possible cardiac damage and possible increased clinical   risk. Serial measurements may help to assess extent of myocardial damage.     >50 ng/L: Consistent with cardiac damage, increased clinical risk and  myocardial infarction. Serial measurements may help assess extent of   myocardial damage.      NOTE: Children less than 1 year old may have higher baseline troponin   levels and results should be interpreted in conjunction with the overall   clinical context.     NOTE: Troponin I testing is performed using a different   testing methodology at [facility] than at other   [facility]. Direct result comparisons should only   be made within the same method.   SERIAL TROPONIN, 1 HOUR - Abnormal    Troponin I, High Sensitivity 21 (*)     Narrative:     Less than 99th percentile of normal range cutoff-  Female and children under 18 years old <14 ng/L; Male <21 ng/L: Negative  Repeat testing should be performed if clinically indicated.     Female and children under 18 years old 14-50 ng/L; Male 21-50 ng/L:  Consistent with possible cardiac damage and possible increased clinical   risk. Serial measurements may help to assess extent of myocardial damage.     >50 ng/L: Consistent with cardiac damage, increased clinical risk and  myocardial infarction. Serial measurements may help assess extent of   myocardial damage.      NOTE: Children less than 1 year old may have higher baseline  troponin   levels and results should be interpreted in conjunction with the overall   clinical context.     NOTE: Troponin I testing is performed using a different   testing methodology at [facility] than at other   [facility] hospitals. Direct result comparisons should only   be made within the same method.   D-DIMER, VTE EXCLUSION - Abnormal    D-Dimer, Quantitative VTE Exclusion 756 (*)     Narrative:     The VTE Exclusion D-Dimer assay is reported in ng/mL Fibrinogen Equivalent Units (FEU).    Per 's instructions for use, a value of less than 500 ng/mL (FEU) may help to exclude DVT or PE in outpatients when the assay is used with a clinical pretest probability assessment.(AEMR must utilize and document eCalc 'Wells Score Deep Vein Thrombosis Risk' for DVT exclusion only. Emergency Department should utilize  Guidelines for Emergency Department Use of the VTE Exclusion D-Dimer and Clinical Pretest probability assessment model for DVT or PE exclusion.)   URINALYSIS WITH REFLEX CULTURE AND MICROSCOPIC - Abnormal    Color, Urine Yellow      Appearance, Urine Clear      Specific Gravity, Urine 1.018      pH, Urine 6.0      Protein, Urine 200 (2+) (*)     Glucose, Urine Normal      Blood, Urine 0.03 (TRACE) (*)     Ketones, Urine NEGATIVE      Bilirubin, Urine NEGATIVE      Urobilinogen, Urine Normal      Nitrite, Urine NEGATIVE      Leukocyte Esterase, Urine 25 Madi/µL (*)    MAGNESIUM - Normal    Magnesium 1.86     SARS-COV-2 AND INFLUENZA A/B PCR - Normal    Flu A Result Not Detected      Flu B Result Not Detected      Coronavirus 2019, PCR Not Detected      Narrative:     This assay has received FDA Emergency Use Authorization (EUA) and  is only authorized for the duration of time that circumstances exist to justify the authorization of the emergency use of in vitro diagnostic tests for the detection of SARS-CoV-2 virus and/or diagnosis of COVID-19 infection under section 564(b)(1) of the  Act, 21 U.S.C. 360bbb-3(b)(1). Testing for SARS-CoV-2 is only recommended for patients who meet current clinical and/or epidemiological criteria as defined by federal, state, or local public health directives. This assay is an in vitro diagnostic nucleic acid amplification test for the qualitative detection of SARS-CoV-2, Influenza A, and Influenza B from nasopharyngeal specimens and has been validated for use at [facility]. Negative results do not preclude COVID-19 infections or Influenza A/B infections, and should not be used as the sole basis for diagnosis, treatment, or other management decisions. If Influenza A/B and RSV PCR results are negative, testing for Parainfluenza virus, Adenovirus and Metapneumovirus is routinely performed for [facility] pediatric oncology and intensive care inpatients, and is available on other patients by placing an add-on request.    URINE CULTURE   TROPONIN SERIES- (INITIAL, 1 HR)    Narrative:     The following orders were created for panel order Troponin I Series, High Sensitivity (0, 1 HR).  Procedure                               Abnormality         Status                     ---------                               -----------         ------                     Troponin I, High Sensiti...[[id number]]  Abnormal            Final result               Troponin, High Sensitivi...[[id number]]  Abnormal            Final result                 Please view results for these tests on the individual orders.   MICROSCOPIC ONLY, URINE    WBC, Urine 1-5      RBC, Urine 1-2      Squamous Epithelial Cells, Urine 1-9 (SPARSE)      Mucus, Urine FEW     URINALYSIS WITH REFLEX CULTURE AND MICROSCOPIC    Narrative:     The following orders were created for panel order Urinalysis with Reflex Culture and Microscopic.  Procedure                               Abnormality         Status                     ---------                               -----------         ------                      Urinalysis with Reflex C...[[id number]]  Abnormal            Final result               Extra Urine Gray Tube[[id number]]                                                         Please view results for these tests on the individual orders.   EXTRA URINE GRAY TUBE       All other labs were within normal range or not returned as of this dictation.    Imaging  XR chest 1 view   Final Result   1. No acute cardiopulmonary process.        MACRO:   None.        Signed by: Carin Thayer 12/31/2024 2:10 PM   Dictation workstation:   [id number]           Procedures  Procedures     EMERGENCY DEPARTMENT COURSE/MDM:   Medical Decision Making  Patient is a 89-year-old female presenting to the emergency department due to episode of chest pain/palpitations last night.  She is currently only complaining of some lightheadedness.  She also has had a cough as well since yesterday..  CBC, CMP, mag, COVID and flu, troponin 0/1-hour as well as a D-dimer.  D-dimer is below age-adjusted suggested cutoff of 970.  Will hold off on CT PE.  Chest x-ray showing no obvious pneumonia, despite some rales noted on physical exam.  Patient does have a significant leukocytosis to 27.9 however no other SIRS criteria.  Troponin elevated 24 on first cycle and pending.  Patient appears to have CKD versus REAL with creatinine of 1.28.  No recent to compare to.  COVID and flu negative.  UA pending.  Given heart score of 5 due to age and risk factors and troponin will admit for further cardiac workup with concern for NSTEMI.  Patient admitted to medicine.  Will follow-up on troponin if we need to reach out to cardiology.  Troponin downtrending.    ED Course as of 12/31/24 1853   Tue Dec 31, 2024   1408 WBC(!): 27.9 [IS]   1408 Creatinine(!): 1.28 [IS]   1409 D-dimer, VTE Exclusion(!)  Age-adjusted D-dimer less than suggested cutoff of 970.  No indication for CT PE at this time [IS]   1414 XR chest 1 view  IMPRESSION:  1. No acute cardiopulmonary  process.   [IS]   1421 Given leukocytosis without any obvious cause at this time will obtain urinalysis.  No sign of pneumonia on chest x-ray. [TL]   1428 EKG performed at 1428 and independently reviewed by provider: Reveals atrial fibrillation with a rate of 80 bpm, normal axis, normal intervals, no ST changes, no T wave abnormalities, no ectopy. No STEMI. [TL]   1438 Troponin is elevated.  No active chest pain at this time.  Will plan for hospitalization given elevated heart score with elevated troponin concerning for possible NSTEMI.  Call placed for admission.     [TL]      ED Course User Index  [IS] Cristian Jones MD  [TL] Brandon Nicholson DO         Diagnoses as of 12/31/24 1853   NSTEMI (non-ST elevated myocardial infarction) (Multi)        External records reviewed: recent inpatient, clinic, and prior ED notes  Labs and Diagnostic imaging independently reviewed/interpreted by me.    Patient plan, care, lab results and imaging were all discussed with attending.    ED Medications administered this visit:    Medications   apixaban (Eliquis) tablet 2.5 mg (has no administration in time range)   valsartan (Diovan) tablet 80 mg (has no administration in time range)   dilTIAZem CD (Cardizem CD) 24 hr capsule 180 mg (has no administration in time range)   furosemide (Lasix) tablet 20 mg (20 mg oral Given 12/31/24 1706)   oxyCODONE-acetaminophen (Percocet) 5-325 mg per tablet 1.5 tablet (has no administration in time range)   pantoprazole (ProtoNix) EC tablet 40 mg (has no administration in time range)   rosuvastatin (Crestor) tablet 5 mg (has no administration in time range)   aspirin chewable tablet 81 mg (has no administration in time range)   guaiFENesin (Robitussin) 100 mg/5 mL syrup 200 mg (200 mg oral Given 12/31/24 1706)     New Prescriptions from this visit:    New Prescriptions    No medications on file       (Please note that portions of this note were completed with a voice recognition program.  Efforts  were made to edit the dictations but occasionally words are mis-transcribed.)     Cristian Jones MD  Resident  12/31/24 [id number]

## 2025-01-01 VITALS
WEIGHT: 107.81 LBS | SYSTOLIC BLOOD PRESSURE: 150 MMHG | TEMPERATURE: 98.2 F | BODY MASS INDEX: 18.4 KG/M2 | OXYGEN SATURATION: 97 % | DIASTOLIC BLOOD PRESSURE: 77 MMHG | RESPIRATION RATE: 16 BRPM | HEART RATE: 73 BPM | HEIGHT: 64 IN

## 2025-01-01 LAB
ANION GAP SERPL CALC-SCNC: 15 MMOL/L (ref 10–20)
BUN SERPL-MCNC: 34 MG/DL (ref 6–23)
CALCIUM SERPL-MCNC: 8.9 MG/DL (ref 8.6–10.3)
CHLORIDE SERPL-SCNC: 101 MMOL/L (ref 98–107)
CHOLEST SERPL-MCNC: 104 MG/DL (ref 0–199)
CHOLESTEROL/HDL RATIO: 2.6
CO2 SERPL-SCNC: 23 MMOL/L (ref 21–32)
CREAT SERPL-MCNC: 1.32 MG/DL (ref 0.5–1.05)
EGFRCR SERPLBLD CKD-EPI 2021: 37 ML/MIN/1.73M*2
ERYTHROCYTE [DISTWIDTH] IN BLOOD BY AUTOMATED COUNT: 14.6 % (ref 11.5–14.5)
GLUCOSE SERPL-MCNC: 109 MG/DL (ref 74–99)
HCT VFR BLD AUTO: 35.8 % (ref 36–46)
HDLC SERPL-MCNC: 39.7 MG/DL
HGB BLD-MCNC: 11.2 G/DL (ref 12–16)
LDLC SERPL CALC-MCNC: 38 MG/DL
MAGNESIUM SERPL-MCNC: 1.86 MG/DL (ref 1.6–2.4)
MCH RBC QN AUTO: 29.3 PG (ref 26–34)
MCHC RBC AUTO-ENTMCNC: 31.3 G/DL (ref 32–36)
MCV RBC AUTO: 94 FL (ref 80–100)
NON HDL CHOLESTEROL: 64 MG/DL (ref 0–149)
NRBC BLD-RTO: 0 /100 WBCS (ref 0–0)
PLATELET # BLD AUTO: 238 X10*3/UL (ref 150–450)
POTASSIUM SERPL-SCNC: 4.1 MMOL/L (ref 3.5–5.3)
RBC # BLD AUTO: 3.82 X10*6/UL (ref 4–5.2)
SODIUM SERPL-SCNC: 135 MMOL/L (ref 136–145)
TRIGL SERPL-MCNC: 131 MG/DL (ref 0–149)
VLDL: 26 MG/DL (ref 0–40)
WBC # BLD AUTO: 20.7 X10*3/UL (ref 4.4–11.3)

## 2025-01-01 PROCEDURE — 80048 BASIC METABOLIC PNL TOTAL CA: CPT | Performed by: STUDENT IN AN ORGANIZED HEALTH CARE EDUCATION/TRAINING PROGRAM

## 2025-01-01 PROCEDURE — 2500000001 HC RX 250 WO HCPCS SELF ADMINISTERED DRUGS (ALT 637 FOR MEDICARE OP): Performed by: NURSE PRACTITIONER

## 2025-01-01 PROCEDURE — 80061 LIPID PANEL: CPT | Performed by: NURSE PRACTITIONER

## 2025-01-01 PROCEDURE — 36415 COLL VENOUS BLD VENIPUNCTURE: CPT | Performed by: STUDENT IN AN ORGANIZED HEALTH CARE EDUCATION/TRAINING PROGRAM

## 2025-01-01 PROCEDURE — 85027 COMPLETE CBC AUTOMATED: CPT | Performed by: STUDENT IN AN ORGANIZED HEALTH CARE EDUCATION/TRAINING PROGRAM

## 2025-01-01 PROCEDURE — G0378 HOSPITAL OBSERVATION PER HR: HCPCS

## 2025-01-01 PROCEDURE — 2500000002 HC RX 250 W HCPCS SELF ADMINISTERED DRUGS (ALT 637 FOR MEDICARE OP, ALT 636 FOR OP/ED): Performed by: NURSE PRACTITIONER

## 2025-01-01 PROCEDURE — 99222 1ST HOSP IP/OBS MODERATE 55: CPT | Performed by: INTERNAL MEDICINE

## 2025-01-01 PROCEDURE — 99239 HOSP IP/OBS DSCHRG MGMT >30: CPT | Performed by: STUDENT IN AN ORGANIZED HEALTH CARE EDUCATION/TRAINING PROGRAM

## 2025-01-01 PROCEDURE — 83735 ASSAY OF MAGNESIUM: CPT | Performed by: STUDENT IN AN ORGANIZED HEALTH CARE EDUCATION/TRAINING PROGRAM

## 2025-01-01 RX ADMIN — GUAIFENESIN 200 MG: 200 SOLUTION ORAL at 13:14

## 2025-01-01 RX ADMIN — ROSUVASTATIN CALCIUM 5 MG: 5 TABLET, FILM COATED ORAL at 09:14

## 2025-01-01 RX ADMIN — APIXABAN 2.5 MG: 5 TABLET, FILM COATED ORAL at 09:13

## 2025-01-01 RX ADMIN — DILTIAZEM HYDROCHLORIDE 180 MG: 180 CAPSULE, COATED, EXTENDED RELEASE ORAL at 09:14

## 2025-01-01 RX ADMIN — ASPIRIN 81 MG 81 MG: 81 TABLET ORAL at 09:13

## 2025-01-01 RX ADMIN — PANTOPRAZOLE SODIUM 40 MG: 40 TABLET, DELAYED RELEASE ORAL at 06:12

## 2025-01-01 RX ADMIN — VALSARTAN 80 MG: 160 TABLET, FILM COATED ORAL at 09:13

## 2025-01-01 ASSESSMENT — COGNITIVE AND FUNCTIONAL STATUS - GENERAL
MOBILITY SCORE: 24
DAILY ACTIVITIY SCORE: 24

## 2025-01-01 ASSESSMENT — PAIN - FUNCTIONAL ASSESSMENT: PAIN_FUNCTIONAL_ASSESSMENT: 0-10

## 2025-01-01 ASSESSMENT — PAIN SCALES - GENERAL: PAINLEVEL_OUTOF10: 0 - NO PAIN

## 2025-01-01 NOTE — NURSING NOTE
Discharge teaching done, pt voiced understanding. Pt taken to private vehicle via wheelchair at this time. Vitals stable see flowsheet

## 2025-01-01 NOTE — CARE PLAN
Problem: Pain - Adult  Goal: Verbalizes/displays adequate comfort level or baseline comfort level  Outcome: Adequate for Discharge   The patient's goals for the shift include pt will sleep in long intervals    The clinical goals for the shift include pt will remain safe throughout the shift      Problem: Safety - Adult  Goal: Free from fall injury  Outcome: Adequate for Discharge     Problem: Discharge Planning  Goal: Discharge to home or other facility with appropriate resources  Outcome: Adequate for Discharge     Pt safely discharged at this time

## 2025-01-01 NOTE — NURSING NOTE
Pt arrived from ed, alert and oriented x 3 admitted for bronchititis, and chest pain, which was resolved prior to coming to ed, pt a brought 2 yellow necklaces, 1 bracelet, clothes and phone, educated on risk of falls, use of ncl and safety. Encourage to call for assistance

## 2025-01-01 NOTE — DISCHARGE SUMMARY
"Discharge Diagnosis  NSTEMI (non-ST elevated myocardial infarction) (Multi)    Issues Requiring Follow-Up  Follow up with pcp and cardiology    Test Results Pending At Discharge  Pending Labs       Order Current Status    Extra Urine Gray Tube Collected ([id number])    Urinalysis with Reflex Culture and Microscopic In process    Urine Culture In process            Hospital Course   Lu Luz is a 89 y.o. female  with history including carotid artery stenosis, hyperlipidemia, hypertension, sick sinus syndrome, A-fib on Eliquis presenting to the emergency department for chest pain that occurred yesterday.  Patient states she was sitting down when she experienced upper diffuse chest discomfort that was non radiating, subsided without intervention. Patient states she went to bed and woke up and has not experienced any symptoms since that time. Daughter at bedside indicates that her mother did not tell her until today, but indicated \"thought I was having a heart attack\" so she brought her to the ED. She states that her mother was at the daughter's 's  on Friday, she was around a lot of people who have indicated they were sick. Patient also reports a wet cough since the weekend. Denies any leg edema, is on Lasix at home, denies any orthopnea. She denies any diaphoresis. She does endorse shortness of breath and nausea with the chest pain episode yesterday which has also resolved.  Has not had any chest pain today, does states she has mild lightheadedness.  Currently denies any chest pain, shortness of breath, abdominal pain, nausea or vomiting, constipation diarrhea, dysuria. Daughter indicates EMS indicated that mom was in atrial fibrillation and she does not remember that being a diagnosis for her. Patient to be admitted for acs rule out.     Hospital course  Patient remained clinically stable overnight, no acute events.  Heart rate remained within normal limits, no chest pain.  Patient was seen by " cardiology, suspect patient may had a episode of transient A-fib with RVR, however since then resolved, due to patient's advanced age, does not recommend any further intervention or changing medication regimen.  Patient cleared for discharge and follow-up as outpatient.    Pertinent Physical Exam At Time of Discharge  Constitutional: Well developed, awake/alert/oriented x3, no distress, alert and cooperative, elderly, frail  Eyes: PERRL, EOMI, clear sclera  ENMT: mucous membranes moist  Head/Neck: Neck supple  Respiratory/Thorax: CTA b/l.   Cardiovascular: Regular, rate and rhythm, no murmurs  Gastrointestinal: Nondistended, soft, non-tender  Musculoskeletal: ROM intact  Extremities: normal extremities      Home Medications     Medication List      CONTINUE taking these medications     candesartan 8 mg tablet; Commonly known as: Atacand   dilTIAZem  mg 24 hr capsule; Commonly known as: Cardizem CD   Eliquis 2.5 mg tablet; Generic drug: apixaban   furosemide 20 mg tablet; Commonly known as: Lasix   oxyCODONE-acetaminophen 7.5-325 mg tablet; Commonly known as: Percocet   pantoprazole 40 mg EC tablet; Commonly known as: ProtoNix   rosuvastatin 5 mg tablet; Commonly known as: Crestor       Outpatient Follow-Up  No future appointments.    Richi Lee MD

## 2025-01-01 NOTE — DISCHARGE INSTRUCTIONS
#it was my pleasure taking care of you during this hospitalization    You were admitted to the hospital due to palpitations/chest pain.  You are seen by cardiology, suspect you may have had a episode of A-fib.  During the course of hospitalization your heart rate remained stable, does not recommend any change on your medication.  Please continue to follow-up with your own cardiologist on discharge.

## 2025-01-01 NOTE — CARE PLAN
The patient's goals for the shift include pt will sleep in long intervals    The clinical goals for the shift include pt will have no c/o chest pain    Over the shift, the patient did  make progress toward the following goals. Pt has compliant of chest pain which is why she went to ed, however was resolved prior to coming to unit, pt stated she was here d/t bronchitis pt has rhonchi in all lung field , has moist non productive cough, , pt aware of orders of cough syrup, has not requested this shift, pt has denied chest pain and has slept throughout this shift, pt has remained safe has ncl within reach and encourage to call for assistance

## 2025-01-01 NOTE — CONSULTS
"Consults  History Of Present Illness:    Lu Luz is a 89 y.o. female with a history of paroxysmal atrial fibrillation on dose adjusted Eliquis, aortic valve sclerosis without stenosis, hypertension, diabetes, dyslipidemia, bilateral carotid artery disease s/p right CEA, PVCs, bradycardia, sick sinus syndrome, and hypertension who presents  after an episode of chest pain.    Her episode of \"chest pain\" occurred on the day prior to admission however she is not alert her daughter until the day of admission which prompted the visit.  She says that she was seated when she experienced a rapid heartbeat in the upper portion of her chest without associated chest pain.  There was some shortness of breath and nausea with the episode.  It lasted a couple of hours and then resolved spontaneously.  She went to bed and it has not returned.    She says that she does have a history of atrial fibrillation.  She does not recall having episode of palpitations like this in the past.    She has been coughing slightly over the last 24 hours but denies any fevers or chills.  The cough is not productive.    Workup in ER included negative troponins.  BUN and creatinine mildly elevated at 34 and 1.32 respectively.  Elevated white count of 20.7.  Chest x-ray with no acute cardiopulmonary process.    Patient typically seen through the [facility] system.  Most recently seen by her cardiologist in July 2024.  Reviewed that note.    Has a history of PVCs and was on metoprolol for several years.  She had recurrent symptoms of palpitations while on metoprolol.  Monitor demonstrated less than 1% PACs and PVCs with very short runs of SVT (longest run 12 beats) along with pauses up to 2.9 seconds.  She was seen by EP in July 2018 for sick sinus syndrome.  Plan was for observation.    Was noted to have significant wheezing and shortness of breath with beta-blockers in the past.  Symptoms significantly improved when switching to " "Cardizem.    Of note amlodipine caused leg swelling in the past and hydralazine caused dizziness.    Echocardiogram 7/3/2023: EF 50 to 60%.  Mild left atrial enlargement.  Aortic sclerosis without stenosis.  RVSP 30 mmHg..     Last Recorded Vitals:  Vitals:    12/31/24 2048 12/31/24 2115 01/01/25 0000 01/01/25 0700   BP: 152/75 (!) 169/99 145/62 141/61   BP Location:  Right arm Right arm Right arm   Patient Position:  Lying Lying Lying   Pulse: 88 93 80 88   Resp:  18 18 16   Temp:  36.4 °C (97.5 °F) 36.9 °C (98.4 °F) 37.1 °C (98.8 °F)   TempSrc:  Temporal Temporal Temporal   SpO2:  95% 94% 96%   Weight:  48.9 kg (107 lb 12.9 oz)     Height:  1.626 m (5' 4\")         Last Labs:  CBC - 1/1/2025:  6:23 AM  20.7 11.2 238    35.8      CMP - 1/1/2025:  6:23 AM  8.9 6.7 27 --- 1.0   _ 4.1 15 69      PTT - No results in last year.  _   _ _     Troponin I, High Sensitivity   Date/Time Value Ref Range Status   12/31/2024 02:38 PM 21 (H) 0 - 13 ng/L Final   12/31/2024 01:34 PM 24 (H) 0 - 13 ng/L Final     Hemoglobin A1C   Date/Time Value Ref Range Status   10/07/2024 02:57 PM 6.5 (H) 4.3 - 5.6 % Final     Comment:     American Diabetes Association guidelines indicate that patients with HgbA1c in the range 5.7-6.4% are at increased risk for development of diabetes, and intervention by lifestyle modification may be beneficial. HgbA1c greater or equal to 6.5% is considered diagnostic of diabetes.   10/17/2023 02:55 PM 6.4 (H) 4.3 - 5.6 % Final     Comment:     American Diabetes Association guidelines indicate that patients with HgbA1c in the range 5.7-6.4% are at increased risk for development of diabetes, and intervention by lifestyle modification may be beneficial. HgbA1c greater or equal to 6.5% is considered diagnostic of diabetes.     LDL Calculated   Date/Time Value Ref Range Status   01/01/2025 06:23 AM 38 <=99 mg/dL Final     Comment:                                 Near   Borderline      AGE      Desirable  Optimal    " "High     High     Very High     0-19 Y     0 - 109     ---    110-129   >/= 130     ----    20-24 Y     0 - 119     ---    120-159   >/= 160     ----      >24 Y     0 -  99   100-129  130-159   160-189     >/=190       VLDL   Date/Time Value Ref Range Status   01/01/2025 06:23 AM 26 0 - 40 mg/dL Final      Last I/O:  I/O last 3 completed shifts:  In: 180 (3.7 mL/kg) [P.O.:180]  Out: - (0 mL/kg)   Weight: 48.9 kg     Past Cardiology Tests (Last 3 Years):  EKG:  No results found for this or any previous visit from the past 1095 days.    Echo:  No results found for this or any previous visit from the past 1095 days.    Ejection Fractions:  No results found for: \"EF\"  Cath:  No results found for this or any previous visit from the past 1095 days.    Stress Test:  No results found for this or any previous visit from the past 1095 days.    Cardiac Imaging:  No results found for this or any previous visit from the past 1095 days.      Past Medical History:  She has no past medical history on file.    Past Surgical History:  She has no past surgical history on file.      Social History:  She reports that she has never smoked. She does not have any smokeless tobacco history on file. No history on file for alcohol use and drug use.    Family History:  No family history on file.     Allergies:  Patient has no known allergies.    Inpatient Medications:  Scheduled medications   Medication Dose Route Frequency    apixaban  2.5 mg oral BID    aspirin  81 mg oral Daily    dilTIAZem CD  180 mg oral BID    furosemide  20 mg oral Daily    pantoprazole  40 mg oral Daily before breakfast    rosuvastatin  5 mg oral Daily    valsartan  80 mg oral Daily     PRN medications   Medication    guaiFENesin    oxyCODONE-acetaminophen     Continuous Medications   Medication Dose Last Rate     Outpatient Medications:  Current Outpatient Medications   Medication Instructions    apixaban (ELIQUIS) 2.5 mg, oral, 2 times daily    candesartan (ATACAND) " 4 mg, oral, 2 times daily    dilTIAZem CD (CARDIZEM CD) 180 mg, oral, 2 times daily    furosemide (LASIX) 20 mg, oral, Daily    oxyCODONE-acetaminophen (Percocet) 7.5-325 mg tablet 1 tablet, oral, Every 8 hours PRN    pantoprazole (PROTONIX) 40 mg, oral, Daily before breakfast, Do not crush, chew, or split.    rosuvastatin (CRESTOR) 5 mg, oral, Daily       Physical Exam:  HEENT: Carotid upstrokes normal with no bruits. JVP is normal.  Pulmonary: Clear to auscultation bilaterally.  Cardiovascular: S1, S2, irregularly irregular. No appreciable murmurs, rubs or gallops.   Lower extremities: Warm. 2+ distal pulses. No edema.       Assessment/Plan   1) chest pain: This was actually described as palpitations and not chest pain.  I presume that this is from an episode of atrial fibrillation potentially with rapid ventricular response however we do not have any data to collaborate this with.  I am not sure for the trigger for this episode however it has not returned and her heart rates have been stable.  I doubt ACS at this time.  Do not believe that additional workup is indicated at this time for what was called chest pain however in my opinion is more likely palpitations alone.    2) paroxysmal atrial fibrillation: On Eliquis for thromboprophylaxis and Cardizem for rate control.  Adverse reaction to beta-blockers in the past with wheezing and shortness of breath.  Would recommend continuing her current medical therapy and asking her to follow-up with her primary cardiologist as an outpatient.    3) hypertension: Blood pressure initially elevated however better controlled while in the hospital.  Continue home regimen.    4) okay to discharge from cardiovascular standpoint.  No further recommendations at this time.  Peripheral IV 12/31/24 Distal;Left Forearm (Active)   Site Assessment Clean;Dry;Intact 01/01/25 0830   Dressing Type Transparent 01/01/25 0830   Line Status Flushed 01/01/25 0830   Dressing Status  Clean;Dry;Occlusive 01/01/25 0830   Number of days: 1       Code Status:  DNR and No Intubation    I spent 35 minutes in the professional and overall care of this patient.        Praveen Coello MD

## 2025-01-02 LAB — BACTERIA UR CULT: NORMAL

## 2025-01-03 LAB
ATRIAL RATE: 375 BPM
Q ONSET: 226 MS
QRS COUNT: 13 BEATS
QRS DURATION: 74 MS
QT INTERVAL: 380 MS
QTC CALCULATION(BAZETT): 438 MS
QTC FREDERICIA: 418 MS
R AXIS: -6 DEGREES
T AXIS: 73 DEGREES
T OFFSET: 416 MS
VENTRICULAR RATE: 80 BPM
